# Patient Record
Sex: MALE | Race: WHITE | NOT HISPANIC OR LATINO | URBAN - METROPOLITAN AREA
[De-identification: names, ages, dates, MRNs, and addresses within clinical notes are randomized per-mention and may not be internally consistent; named-entity substitution may affect disease eponyms.]

---

## 2021-03-29 ENCOUNTER — EMERGENCY (EMERGENCY)
Facility: HOSPITAL | Age: 56
LOS: 1 days | Discharge: HOME | End: 2021-03-29
Attending: EMERGENCY MEDICINE
Payer: MEDICARE

## 2021-03-29 VITALS
HEART RATE: 68 BPM | DIASTOLIC BLOOD PRESSURE: 78 MMHG | OXYGEN SATURATION: 99 % | TEMPERATURE: 98 F | RESPIRATION RATE: 19 BRPM | SYSTOLIC BLOOD PRESSURE: 137 MMHG

## 2021-03-29 VITALS
RESPIRATION RATE: 26 BRPM | WEIGHT: 184.97 LBS | SYSTOLIC BLOOD PRESSURE: 109 MMHG | HEIGHT: 69 IN | DIASTOLIC BLOOD PRESSURE: 71 MMHG | HEART RATE: 96 BPM | OXYGEN SATURATION: 99 % | TEMPERATURE: 98 F

## 2021-03-29 LAB
ALBUMIN SERPL ELPH-MCNC: 4.2 G/DL — SIGNIFICANT CHANGE UP (ref 3.3–5)
ALP SERPL-CCNC: 55 U/L — SIGNIFICANT CHANGE UP (ref 40–120)
ALT FLD-CCNC: 32 U/L — SIGNIFICANT CHANGE UP (ref 10–45)
ANION GAP SERPL CALC-SCNC: 9 MMOL/L — SIGNIFICANT CHANGE UP (ref 5–17)
APTT BLD: 29.6 SEC — SIGNIFICANT CHANGE UP (ref 27.5–35.5)
AST SERPL-CCNC: 55 U/L — HIGH (ref 10–40)
BASOPHILS # BLD AUTO: 0.05 K/UL — SIGNIFICANT CHANGE UP (ref 0–0.2)
BASOPHILS NFR BLD AUTO: 0.6 % — SIGNIFICANT CHANGE UP (ref 0–2)
BILIRUB SERPL-MCNC: 0.2 MG/DL — SIGNIFICANT CHANGE UP (ref 0.2–1.2)
BUN SERPL-MCNC: 38 MG/DL — HIGH (ref 7–23)
CALCIUM SERPL-MCNC: 12.4 MG/DL — HIGH (ref 8.4–10.5)
CHLORIDE SERPL-SCNC: 102 MMOL/L — SIGNIFICANT CHANGE UP (ref 96–108)
CO2 SERPL-SCNC: 24 MMOL/L — SIGNIFICANT CHANGE UP (ref 22–31)
CREAT SERPL-MCNC: 2.09 MG/DL — HIGH (ref 0.5–1.3)
EOSINOPHIL # BLD AUTO: 0.34 K/UL — SIGNIFICANT CHANGE UP (ref 0–0.5)
EOSINOPHIL NFR BLD AUTO: 4.2 % — SIGNIFICANT CHANGE UP (ref 0–6)
GLUCOSE SERPL-MCNC: 121 MG/DL — HIGH (ref 70–99)
HCT VFR BLD CALC: 37.1 % — LOW (ref 39–50)
HGB BLD-MCNC: 11.1 G/DL — LOW (ref 13–17)
IMM GRANULOCYTES NFR BLD AUTO: 0.5 % — SIGNIFICANT CHANGE UP (ref 0–1.5)
INR BLD: 0.89 RATIO — SIGNIFICANT CHANGE UP (ref 0.88–1.16)
LIDOCAIN IGE QN: 64 U/L — HIGH (ref 7–60)
LYMPHOCYTES # BLD AUTO: 1.22 K/UL — SIGNIFICANT CHANGE UP (ref 1–3.3)
LYMPHOCYTES # BLD AUTO: 14.9 % — SIGNIFICANT CHANGE UP (ref 13–44)
MCHC RBC-ENTMCNC: 25.3 PG — LOW (ref 27–34)
MCHC RBC-ENTMCNC: 29.9 GM/DL — LOW (ref 32–36)
MCV RBC AUTO: 84.5 FL — SIGNIFICANT CHANGE UP (ref 80–100)
MONOCYTES # BLD AUTO: 0.8 K/UL — SIGNIFICANT CHANGE UP (ref 0–0.9)
MONOCYTES NFR BLD AUTO: 9.8 % — SIGNIFICANT CHANGE UP (ref 2–14)
NEUTROPHILS # BLD AUTO: 5.72 K/UL — SIGNIFICANT CHANGE UP (ref 1.8–7.4)
NEUTROPHILS NFR BLD AUTO: 70 % — SIGNIFICANT CHANGE UP (ref 43–77)
NRBC # BLD: 0 /100 WBCS — SIGNIFICANT CHANGE UP (ref 0–0)
PLATELET # BLD AUTO: 363 K/UL — SIGNIFICANT CHANGE UP (ref 150–400)
POTASSIUM SERPL-MCNC: 4.7 MMOL/L — SIGNIFICANT CHANGE UP (ref 3.5–5.3)
POTASSIUM SERPL-SCNC: 4.7 MMOL/L — SIGNIFICANT CHANGE UP (ref 3.5–5.3)
PROT SERPL-MCNC: 6.8 G/DL — SIGNIFICANT CHANGE UP (ref 6–8.3)
PROTHROM AB SERPL-ACNC: 10.7 SEC — SIGNIFICANT CHANGE UP (ref 10.6–13.6)
RBC # BLD: 4.39 M/UL — SIGNIFICANT CHANGE UP (ref 4.2–5.8)
RBC # FLD: 18.7 % — HIGH (ref 10.3–14.5)
SARS-COV-2 RNA SPEC QL NAA+PROBE: SIGNIFICANT CHANGE UP
SODIUM SERPL-SCNC: 135 MMOL/L — SIGNIFICANT CHANGE UP (ref 135–145)
TROPONIN T, HIGH SENSITIVITY RESULT: 12 NG/L — SIGNIFICANT CHANGE UP (ref 0–51)
TROPONIN T, HIGH SENSITIVITY RESULT: 14 NG/L — SIGNIFICANT CHANGE UP (ref 0–51)
WBC # BLD: 8.17 K/UL — SIGNIFICANT CHANGE UP (ref 3.8–10.5)
WBC # FLD AUTO: 8.17 K/UL — SIGNIFICANT CHANGE UP (ref 3.8–10.5)

## 2021-03-29 PROCEDURE — 71045 X-RAY EXAM CHEST 1 VIEW: CPT | Mod: 26

## 2021-03-29 PROCEDURE — 80053 COMPREHEN METABOLIC PANEL: CPT

## 2021-03-29 PROCEDURE — 85025 COMPLETE CBC W/AUTO DIFF WBC: CPT

## 2021-03-29 PROCEDURE — 93005 ELECTROCARDIOGRAM TRACING: CPT

## 2021-03-29 PROCEDURE — U0003: CPT

## 2021-03-29 PROCEDURE — 83690 ASSAY OF LIPASE: CPT

## 2021-03-29 PROCEDURE — 85610 PROTHROMBIN TIME: CPT

## 2021-03-29 PROCEDURE — 99285 EMERGENCY DEPT VISIT HI MDM: CPT | Mod: CS

## 2021-03-29 PROCEDURE — 99285 EMERGENCY DEPT VISIT HI MDM: CPT | Mod: 25

## 2021-03-29 PROCEDURE — 71045 X-RAY EXAM CHEST 1 VIEW: CPT

## 2021-03-29 PROCEDURE — 76705 ECHO EXAM OF ABDOMEN: CPT

## 2021-03-29 PROCEDURE — 84484 ASSAY OF TROPONIN QUANT: CPT

## 2021-03-29 PROCEDURE — 76705 ECHO EXAM OF ABDOMEN: CPT | Mod: 26

## 2021-03-29 PROCEDURE — 36000 PLACE NEEDLE IN VEIN: CPT

## 2021-03-29 PROCEDURE — 85730 THROMBOPLASTIN TIME PARTIAL: CPT

## 2021-03-29 PROCEDURE — U0005: CPT

## 2021-03-29 PROCEDURE — 93010 ELECTROCARDIOGRAM REPORT: CPT

## 2021-03-29 PROCEDURE — 80048 BASIC METABOLIC PNL TOTAL CA: CPT

## 2021-03-29 RX ORDER — SODIUM CHLORIDE 9 MG/ML
1000 INJECTION INTRAMUSCULAR; INTRAVENOUS; SUBCUTANEOUS ONCE
Refills: 0 | Status: COMPLETED | OUTPATIENT
Start: 2021-03-29 | End: 2021-03-29

## 2021-03-29 RX ADMIN — SODIUM CHLORIDE 1000 MILLILITER(S): 9 INJECTION INTRAMUSCULAR; INTRAVENOUS; SUBCUTANEOUS at 12:15

## 2021-03-29 NOTE — ED ADULT NURSE NOTE - OBJECTIVE STATEMENT
55 year old male complaining of intermittent chest/ abdominal pain x1 week. pt states  he has been under a lot of stress recently and  has past away x1 month ago. he complains of having sudden onset of intermitted abdominal chest pain. pt states he has had multiple episodes on vomiting on Thursday, Friday, Saturday, but has not had any episodes of vomiting since then. indorse chills but denies having any fever.. hx of bipolar he is currently being seen by psychiatry and therapist and states he has seen both about 1week ago, pt reports "I have been said about my  passing" but reports no suicidal thoughts. pt is sitting up, appears to be in no acute distress.  breathing spontaneously, unlabored, and lung sounds clear. skin is warm, pink, and dry. abdomen is soft and not distended. pt on cardiac monitor in NSR.  call bell within reach and bed in lowest position.

## 2021-03-29 NOTE — ED PROVIDER NOTE - PHYSICAL EXAMINATION
Gen: Patient is well-appearing, NAD, AAOx3, able to ambulate without assistance.  HEENT: NCAT, EOMI, STARR, normal conjunctiva, tongue midline, oral mucosa moist.  Lung: CTAB, no respiratory distress, no wheezes/rhonchi/rales B/L, speaking in full sentences.  CV: RRR, no murmurs, rubs or gallops, distal pulses 2+ b/l.  Abd: soft, NT, ND, no guarding, no rigidity, no rebound tenderness, no CVA tenderness.   MSK: no visible deformities, ROM normal in UE/LE, no back TTP.  Neuro: No focal sensory or motor deficits.  Skin: Warm, well perfused, no rash, no leg swelling.  Psych: normal affect, calm. Gen: Patient is well-appearing, NAD, AAOx3, able to ambulate without assistance.  HEENT: NCAT, EOMI, STARR, normal conjunctiva, tongue midline, oral mucosa moist.  Lung: CTAB, no respiratory distress, no wheezes/rhonchi/rales B/L, speaking in full sentences.  CV: RRR, no murmurs, rubs or gallops, distal pulses 2+ b/l.  Abd: soft, diffuse ttp, ND, no guarding, no rigidity, no rebound tenderness, no CVA tenderness.   MSK: no visible deformities, ROM normal in UE/LE, no back TTP.  Neuro: No focal sensory or motor deficits.  Skin: Warm, well perfused, no rash, no leg swelling.  Psych: normal affect, calm.

## 2021-03-29 NOTE — ED PROVIDER NOTE - ATTENDING CONTRIBUTION TO CARE
I performed a history and physical exam of the patient and discussed their management with the resident and /or advanced care provider. I reviewed the resident and /or ACP's note and agree with the documented findings and plan of care. My medical decision making and observations are found above.  Lungs clear, abd soft but diffusely tender. no murphies, no pain over appy.

## 2021-03-29 NOTE — ED PROVIDER NOTE - PATIENT PORTAL LINK FT
You can access the FollowMyHealth Patient Portal offered by [facility] by registering at the following website: [email or web address]. By joining [facility]’s FollowMyHealth portal, you will also be able to view your health information using other applications (apps) compatible with our system.

## 2021-03-29 NOTE — ED PROVIDER NOTE - NS ED ROS FT
Constitutional:  (-) fever, (-) chills, (-) lethargy  Eyes:  (-) eye pain (-) visual changes  ENMT: (-) nasal discharge, (-) sore throat. (-) neck pain or stiffness  Cardiac: (+) chest pain (-) palpitations  Respiratory:  (+) cough (-) respiratory distress.   GI:  (+) nausea (+) vomiting (-) diarrhea (+) abdominal pain.  :  (-) dysuria (-) frequency (-) burning.  MS:  (-) back pain (-) joint pain.  Neuro:  (-) headache (-) numbness (-) tingling (-) focal weakness.  Skin:  (-) rash.  Except as documented in the HPI,  all other systems are negative.

## 2021-03-29 NOTE — ED PROVIDER NOTE - OBJECTIVE STATEMENT
56 yo M with hx of bipolar disorder p/w intermittent chest/abdominal pain x 1 week. 56 yo M with hx of bipolar disorder p/w intermittent chest/abdominal pain x 1 week. Patient returned from [facility] 1 month ago, where his  suddenly passed. Since he has not felt like himself. He describes dull pain in the abdomen, migrating to the chest with associated nonproductive cough, nausea/vomiting. Denies fevers/chills, urinary symptoms, diarrhea/constipation. 54 yo M with hx of bipolar disorder p/w intermittent chest/abdominal pain x 1 week. Patient returned from [facility] 1 month ago, where his  suddenly passed. Since he has not felt like himself. He describes dull pain in the abdomen, migrating to the chest with associated nonproductive cough mild sob, nausea/vomiting. Denies fevers/chills, urinary symptoms, diarrhea/constipation.

## 2021-03-29 NOTE — ED PROVIDER NOTE - NSFOLLOWUPINSTRUCTIONS_ED_ALL_ED_FT
You were evaluated in the Emergency Department for CHEST and ABDOMINAL PAIN.     There are no signs of emergency conditions requiring admission to the hospital on today's workup.      Further evaluation may be required by your primary care doctor or specialist for a definitive diagnosis.  Therefore, follow up as directed and if symptoms change/worsen or any emergency conditions, please return to the ER.    We recommend that you:  1. See your primary care physician within the next 1-3 DAYS for follow up.  Bring a copy of your discharge paperwork (including any test results) to your doctor.    2. You had a LIVER CYST noted on ultrasound. Please follow up with your primary for further evaluation.     3. Continue to keep well hydrated and rested. For pain you can take ibuprofen (Motrin, Advil) or acetaminophen (Tylenol) as needed, as directed on packaging.       *** Return immediately if you have any other new/concerning symptoms. ***

## 2021-03-29 NOTE — ED PROVIDER NOTE - CLINICAL SUMMARY MEDICAL DECISION MAKING FREE TEXT BOX
Lance: pt with chest pain and abd pain since wed. on and off. cardiac risk is low. AAA is possible but less likely with intermittent aspects. Will get ekg, labs and US.

## 2022-07-31 ENCOUNTER — EMERGENCY (EMERGENCY)
Facility: HOSPITAL | Age: 57
LOS: 1 days | Discharge: HOME | End: 2022-07-31
Attending: STUDENT IN AN ORGANIZED HEALTH CARE EDUCATION/TRAINING PROGRAM | Admitting: STUDENT IN AN ORGANIZED HEALTH CARE EDUCATION/TRAINING PROGRAM

## 2022-07-31 VITALS
DIASTOLIC BLOOD PRESSURE: 67 MMHG | TEMPERATURE: 98 F | HEIGHT: 69 IN | SYSTOLIC BLOOD PRESSURE: 137 MMHG | OXYGEN SATURATION: 99 % | HEART RATE: 70 BPM | RESPIRATION RATE: 16 BRPM

## 2022-07-31 VITALS
OXYGEN SATURATION: 100 % | HEART RATE: 67 BPM | DIASTOLIC BLOOD PRESSURE: 85 MMHG | TEMPERATURE: 98 F | RESPIRATION RATE: 16 BRPM | SYSTOLIC BLOOD PRESSURE: 150 MMHG

## 2022-07-31 PROBLEM — F31.9 BIPOLAR DISORDER, UNSPECIFIED: Chronic | Status: ACTIVE | Noted: 2021-03-29

## 2022-07-31 PROCEDURE — 73600 X-RAY EXAM OF ANKLE: CPT | Mod: 26,RT

## 2022-07-31 PROCEDURE — 99284 EMERGENCY DEPT VISIT MOD MDM: CPT | Mod: GC

## 2022-07-31 PROCEDURE — 73560 X-RAY EXAM OF KNEE 1 OR 2: CPT | Mod: 26,RT

## 2022-07-31 PROCEDURE — 73620 X-RAY EXAM OF FOOT: CPT | Mod: 26,RT

## 2022-07-31 PROCEDURE — 73100 X-RAY EXAM OF WRIST: CPT | Mod: 26,RT

## 2022-07-31 PROCEDURE — 73120 X-RAY EXAM OF HAND: CPT | Mod: 26,RT

## 2022-07-31 PROCEDURE — 72170 X-RAY EXAM OF PELVIS: CPT | Mod: 26

## 2022-07-31 PROCEDURE — 71045 X-RAY EXAM CHEST 1 VIEW: CPT | Mod: 26

## 2022-07-31 RX ORDER — TETANUS TOXOID, REDUCED DIPHTHERIA TOXOID AND ACELLULAR PERTUSSIS VACCINE, ADSORBED 5; 2.5; 8; 8; 2.5 [IU]/.5ML; [IU]/.5ML; UG/.5ML; UG/.5ML; UG/.5ML
0.5 SUSPENSION INTRAMUSCULAR ONCE
Refills: 0 | Status: COMPLETED | OUTPATIENT
Start: 2022-07-31 | End: 2022-07-31

## 2022-07-31 RX ORDER — ACETAMINOPHEN 500 MG
650 TABLET ORAL ONCE
Refills: 0 | Status: COMPLETED | OUTPATIENT
Start: 2022-07-31 | End: 2022-07-31

## 2022-07-31 RX ADMIN — TETANUS TOXOID, REDUCED DIPHTHERIA TOXOID AND ACELLULAR PERTUSSIS VACCINE, ADSORBED 0.5 MILLILITER(S): 5; 2.5; 8; 8; 2.5 SUSPENSION INTRAMUSCULAR at 12:33

## 2022-07-31 RX ADMIN — Medication 650 MILLIGRAM(S): at 12:33
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
## 2022-07-31 NOTE — ED PROVIDER NOTE - PATIENT PORTAL LINK FT
You can access the FollowMyHealth Patient Portal offered by [facility] by registering at the following website: [email or web address]. By joining [facility]’s FollowMyHealth portal, you will also be able to view your health information using other applications (apps) compatible with our system.
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
## 2022-07-31 NOTE — ED ADULT NURSE NOTE - NSIMPLEMENTINTERV_GEN_ALL_ED
Implemented All Universal Safety Interventions:  [place] to call system. Call bell, personal items and telephone within reach. Instruct patient to call for assistance. Room bathroom lighting operational. Non-slip footwear when patient is off stretcher. Physically safe environment: no spills, clutter or unnecessary equipment. Stretcher in lowest position, wheels locked, appropriate side rails in place.
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
## 2022-07-31 NOTE — ED ADULT TRIAGE NOTE - IDEAL BODY WEIGHT(KG)
Testosterone rx approved  Patient needs appt-- Please call, he may have been considering [facility] location [id number]

## 2022-07-31 NOTE — ED PROVIDER NOTE - PHYSICAL EXAMINATION
Gen: NAD. A&Ox4. Non-toxic appearing.  HEENT: Normocephalic and atraumatic. PERRL, EOMI, no nasal discharge, mucous membranes moist, no scleral icterus.  CV: Regular rate and rhythm,  no M/R/G. No significant lower extremity edema. Radial and DP pulses present and symmetrical. Capillary refill less than 2 seconds.  Resp: Normal effort and rate. CTAB, no rales, rhonchi, or wheezes.  GI: Abdomen soft, non-distended, non tender to palpation. No masses appreciated. Bowel sounds present.  MSK: No cervical tenderness. No midline tenders. Tenderness to palpation of right lower rib. Tenderness to right metacarpal region with swelling, no erythema, full ROM. Tenderness to anterior right ankle on palpation. Abrasions on right leg with no active bleeding.   Neuro: Following commands, speaking in full sentences, moving extremities spontaneously. CN II-XII intact. Strength and sensation symmetric and intact throughout. Gait normal.  Psych: Appropriate mood, cooperative
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
## 2022-07-31 NOTE — ED PROVIDER NOTE - PROGRESS NOTE DETAILS
Patient's pain improved with pain meds. Able to ambulate. no fracture seen on x-ray can be discharged home.

## 2023-11-28 ENCOUNTER — EMERGENCY (EMERGENCY)
Facility: HOSPITAL | Age: 58
LOS: 1 days | Discharge: HOME | End: 2023-11-28
Attending: EMERGENCY MEDICINE
Payer: MEDICARE

## 2023-11-28 VITALS
RESPIRATION RATE: 16 BRPM | DIASTOLIC BLOOD PRESSURE: 82 MMHG | SYSTOLIC BLOOD PRESSURE: 157 MMHG | OXYGEN SATURATION: 97 % | HEART RATE: 51 BPM | TEMPERATURE: 98 F

## 2023-11-28 VITALS
HEIGHT: 68 IN | HEART RATE: 51 BPM | RESPIRATION RATE: 18 BRPM | DIASTOLIC BLOOD PRESSURE: 91 MMHG | TEMPERATURE: 98 F | WEIGHT: 188.94 LBS | SYSTOLIC BLOOD PRESSURE: 159 MMHG | OXYGEN SATURATION: 97 %

## 2023-11-28 PROCEDURE — 20552 NJX 1/MLT TRIGGER POINT 1/2: CPT

## 2023-11-28 PROCEDURE — 20552 NJX 1/MLT TRIGGER POINT 1/2: CPT | Mod: LT

## 2023-11-28 PROCEDURE — 99284 EMERGENCY DEPT VISIT MOD MDM: CPT | Mod: 25,GC

## 2023-11-28 PROCEDURE — 99284 EMERGENCY DEPT VISIT MOD MDM: CPT | Mod: 25

## 2023-11-28 RX ORDER — LIDOCAINE 4 G/100G
1 CREAM TOPICAL ONCE
Refills: 0 | Status: COMPLETED | OUTPATIENT
Start: 2023-11-28 | End: 2023-11-28

## 2023-11-28 RX ORDER — OXYCODONE HYDROCHLORIDE 5 MG/1
5 TABLET ORAL ONCE
Refills: 0 | Status: DISCONTINUED | OUTPATIENT
Start: 2023-11-28 | End: 2023-11-28

## 2023-11-28 RX ORDER — METHOCARBAMOL 500 MG/1
1500 TABLET, FILM COATED ORAL ONCE
Refills: 0 | Status: COMPLETED | OUTPATIENT
Start: 2023-11-28 | End: 2023-11-28

## 2023-11-28 RX ORDER — BUPIVACAINE HCL/PF 7.5 MG/ML
30 VIAL (ML) INJECTION ONCE
Refills: 0 | Status: COMPLETED | OUTPATIENT
Start: 2023-11-28 | End: 2023-11-28

## 2023-11-28 RX ORDER — ACETAMINOPHEN 500 MG
650 TABLET ORAL ONCE
Refills: 0 | Status: COMPLETED | OUTPATIENT
Start: 2023-11-28 | End: 2023-11-28

## 2023-11-28 RX ADMIN — LIDOCAINE 1 PATCH: 4 CREAM TOPICAL at 10:53

## 2023-11-28 RX ADMIN — METHOCARBAMOL 1500 MILLIGRAM(S): 500 TABLET, FILM COATED ORAL at 10:53

## 2023-11-28 RX ADMIN — Medication 650 MILLIGRAM(S): at 10:49

## 2023-11-28 RX ADMIN — OXYCODONE HYDROCHLORIDE 5 MILLIGRAM(S): 5 TABLET ORAL at 10:50

## 2023-11-28 RX ADMIN — Medication 30 MILLILITER(S): at 12:14

## 2023-11-28 NOTE — ED PROCEDURE NOTE - PROCEDURE ADDITIONAL DETAILS
Trigger point injection with bupivacaine, site prepped with chlorhexidine, 20cc of fluids used. Patient reassessed after, minimal complications 2 Trigger point injections with bupivacaine 0.25%, site prepped with chlorhexidine, 7cc each site. Patient reassessed after, pain resolved.

## 2023-11-28 NOTE — ED PROVIDER NOTE - PATIENT PORTAL LINK FT
You can access the FollowMyHealth Patient Portal offered by [facility] by registering at the following website: [email or web address]. By joining [facility]’s FollowMyHealth portal, you will also be able to view your health information using other applications (apps) compatible with our system.

## 2023-11-28 NOTE — ED ADULT NURSE NOTE - OBJECTIVE STATEMENT
57 yo presents to the ED from home. A&Ox4 by EMS c/o lower L side of back pain. pt reports he was raking leaves x 1 week ago. pt reports worsening pain with radiation down LLE. took Motrin at home without relief. has not been able to sleep x 3 days due to severe pain. denies any history of spinal issues. MD at bedside for eval. side rails up for safety. warm blanket provided, vital signs stable, pt in no acute distress.

## 2023-11-28 NOTE — ED ADULT TRIAGE NOTE - BSA (M2)
Vaccine Information Statement(s) for was given {today/yesterday:6582918::\"today\"}. This has been reviewed, questions answered, and verbal consent given by {CONSENT RECIPIENTS:127014} for injection(s) and administration of {VIS:1141042}.    {Flu Questions - hit delete key if not administering a Influenza Vaccine:891269}    Patient tolerated without incident. See immunization grid for documentation.     2

## 2023-11-28 NOTE — ED PROVIDER NOTE - PROGRESS NOTE DETAILS
Mary Danielle, PGY-2 DO:  Patient re-evaluated at bedside and is now currently feeling better after treatment. Patient received trigger point inject and improved significantly.

## 2023-11-28 NOTE — ED PROVIDER NOTE - CARE PLAN
Principal Discharge DX:	Palpitations  Secondary Diagnosis:	Hypertension   1 Principal Discharge DX:	Back spasm

## 2023-11-28 NOTE — ED PROVIDER NOTE - ATTENDING CONTRIBUTION TO CARE
Hx: pt with h/o HTN and HLD, not taking meds as prescribed, presenting with palpitations and lightheadedness when standing, onset yesterday, again today.  No chest pain.  No fever, cough.  Noted high BP at home and at , sent here for eval, took his candesartan 8mg this morning.      Vitals reviewed, Head Atraumatic, PERRL, EOMI, moist mucous membranes, neck supple, no signs of meningitis, chest clear to ausculation, cardiac regular rate and rhythym, Abdomen soft and nontender, Extremities well perfused and without edema, Skin intact, Psych normal mood, Neuro nonfocal.     MDM: palpitations, lightheadedness, labile BP readings.  In ED here, BPs vary from 140s/80s to 180s/100s. HR 60s-70s.  Suspect some "white-coat" hypertension.  check cbc r/o anemia or leukocytosis, check bmp to r/o metabolic derangement and lyte imbalance, trop.    Reviewed old records and exertional records.  Pt with normal ct coronary heart last year.   Doubt ACS given ct coronary.  Suspect anxiety or possibly due to patient's tobacco and alcohol usage (smokes and vapes, drinking 3-4 drinks 4-5 times a week).      If labs nonactionable, will refer back to cardiologist for likely cardiac monitoring at home and possibly repeat echo. Hx: pt with severe LEFT low back pain s/p using leaf blower few days ago, delayed onset, worse with movement, associated with some leg spams.  No neuro sxs.  No fever, midline pain.    Vitals reviewed, Head Atraumatic, PERRL, EOMI, moist mucous membranes, neck supple, no signs of meningitis, chest clear to ausculation, cardiac regular rate and rhythym, Abdomen soft and nontender, Extremities well perfused and without edema, Skin intact, Psych normal mood, Neuro nonfocal.   +td L lumbar muscles.    MDM: lumbar muscle spasms.  Pain control, anti-spasm meds, consider trigger point injection.  No indication for emergency xrays of spine at this time.

## 2023-11-28 NOTE — ED PROVIDER NOTE - NSFOLLOWUPINSTRUCTIONS_ED_ALL_ED_FT
Return to the ER for any new or concerning symptoms.     You may take 650 mg acetaminophen or 600 mg of Motrin ever six hours as needed for pain.   Please remember to stretch!  Drink plenty of fluids and rest.    Back Pain    WHAT YOU NEED TO KNOW:    What do I need to know about back pain? Back pain is common. You may have back pain and muscle spasms. You may feel sore or stiff on one or both sides of your back. The pain may spread to your lower body.    What increases my risk for back pain?    A condition that affects your spine, joints, or muscles, such as muscle tension or disc problems    Repeated bending, lifting, or twisting, or lifting heavy items    Injury from a fall or accident    Lack of regular physical activity    Obesity or pregnancy    Smoking    Aging    Driving, sitting, or standing for long periods    Bad posture while sitting or standing  How is back pain diagnosed? Your healthcare provider will ask if you have any medical conditions. He or she may ask if you have a history of back pain and how it started. He or she may watch you stand and walk, and check your range of motion. Show him or her where you feel pain and what makes it better or worse. Describe the pain, how bad it is, and how long it lasts. Tell your provider if your pain worsens at night or when you lie on your back.    How is back pain treated?    Medicines:  NSAIDs help decrease swelling and pain or fever. This medicine is available with or without a doctor's order. NSAIDs can cause stomach bleeding or kidney problems in certain people. If you take blood thinner medicine, always ask your healthcare provider if NSAIDs are safe for you. Always read the medicine label and follow directions.    Acetaminophen decreases pain and fever. It is available without a doctor's order. Ask how much to take and how often to take it. Follow directions. Read the labels of all other medicines you are using to see if they also contain acetaminophen, or ask your doctor or pharmacist. Acetaminophen can cause liver damage if not taken correctly.    Muscle relaxers help decrease muscle spasms and back pain.    Acupressure may be recommended to decrease pain and improve movement. Acupressure is pressure or localized massage to the area of your back pain.    A transcutaneous electrical nerve stimulation (TENS) unit is a portable, pocket-sized, battery-powered device that attaches to your skin. It is usually placed over the area of pain. It uses mild, safe electrical signals to help control pain.  How do I manage back pain?    Apply ice on your back for 15 to 20 minutes every hour or as directed. Use an ice pack, or put crushed ice in a plastic bag. Cover it with a towel before you apply it to your skin. Ice helps prevent tissue damage and decreases pain.    Apply heat on your back for 20 to 30 minutes every 2 hours for as many days as directed. Heat helps decrease pain and muscle spasms.    Stay active as much as you can without causing more pain. Bed rest could make your back pain worse. Avoid heavy lifting until your pain is gone.   Family Walking for Exercise      Go to physical therapy as directed. A physical therapist can teach you exercises to help improve movement and strength, and to decrease pain.  Call your local emergency number (911 in the [place]) if:    You have severe back pain with chest pain.    You cannot control your urine or bowel movements.    Your pain becomes so severe that you cannot walk.  When should I seek immediate care?    You have pain, numbness, or weakness in one or both legs.    You have severe back pain, nausea, and vomiting.    You have severe back pain that spreads to your side or genital area.  When should I call my doctor?    You have back pain that does not get better with rest and pain medicine.    You have a fever.    You have pain that worsens when you are on your back or when you rest.    You have pain that worsens when you cough or sneeze.    You lose weight without trying.    You have questions or concerns about your condition or care.

## 2023-11-28 NOTE — ED ADULT NURSE NOTE - NSFALLRISKINTERV_ED_ALL_ED
Assistance OOB with selected safe patient handling equipment if applicable/Assistance with ambulation/Communicate fall risk and risk factors to all staff, patient, and family/Monitor gait and stability/Provide visual cue: yellow wristband, yellow gown, etc/Reinforce activity limits and safety measures with patient and family/Call bell, personal items and telephone in reach/Instruct patient to call for assistance before getting out of bed/chair/stretcher/Non-slip footwear applied when patient is off stretcher/[place] to call system/Physically safe environment - no spills, clutter or unnecessary equipment/Purposeful Proactive Rounding/Room/bathroom lighting operational, light cord in reach

## 2024-01-31 NOTE — ED ADULT TRIAGE NOTE - BP NONINVASIVE DIASTOLIC (MM HG)
"[facility]  ED Arrival Note    Pt BIBA for possible syncope vs seizure like activity.  Reportedly pt had shoulder surgery yesterday. Pt woke up at 4 am to take an oxycodone tablet (he states he took 2 pills) and next thing he remembers is his mother attempting to wake him up. Pt had another seizure like activity (wife states that pt was shaking and slumped on the floor) which lasted \"about 30 seconds\". Pt's wife called EMS. Pt has no prior hx of seizure.  EMS found pt A+O x4; sitting on a chair and c/o severe shoulder pain.    Pre hospital BG~ not performed.    EMS interventions: VSS.    Visitors during triage: Spouse    Directed to: Main ED    Pronouns: he/him          " 91